# Patient Record
Sex: MALE | Race: WHITE | Employment: FULL TIME | URBAN - METROPOLITAN AREA
[De-identification: names, ages, dates, MRNs, and addresses within clinical notes are randomized per-mention and may not be internally consistent; named-entity substitution may affect disease eponyms.]

---

## 2017-05-01 ENCOUNTER — OFFICE VISIT (OUTPATIENT)
Dept: FAMILY MEDICINE CLINIC | Facility: CLINIC | Age: 42
End: 2017-05-01

## 2017-05-01 VITALS
WEIGHT: 174 LBS | BODY MASS INDEX: 30.83 KG/M2 | DIASTOLIC BLOOD PRESSURE: 66 MMHG | HEIGHT: 63 IN | SYSTOLIC BLOOD PRESSURE: 120 MMHG | RESPIRATION RATE: 16 BRPM | HEART RATE: 66 BPM

## 2017-05-01 DIAGNOSIS — M54.50 ACUTE BILATERAL LOW BACK PAIN WITHOUT SCIATICA: Primary | ICD-10-CM

## 2017-05-01 PROCEDURE — 99214 OFFICE O/P EST MOD 30 MIN: CPT | Performed by: FAMILY MEDICINE

## 2017-05-01 RX ORDER — CYCLOBENZAPRINE HCL 10 MG
10 TABLET ORAL 3 TIMES DAILY
Qty: 30 TABLET | Refills: 0 | Status: SHIPPED | OUTPATIENT
Start: 2017-05-01 | End: 2017-05-21

## 2017-05-01 RX ORDER — IBUPROFEN 600 MG/1
600 TABLET ORAL EVERY 6 HOURS PRN
Qty: 30 TABLET | Refills: 2 | Status: SHIPPED | OUTPATIENT
Start: 2017-05-01

## 2017-05-01 RX ORDER — HYDROCODONE BITARTRATE AND ACETAMINOPHEN 5; 325 MG/1; MG/1
1-2 TABLET ORAL EVERY 8 HOURS PRN
Qty: 20 TABLET | Refills: 0 | Status: SHIPPED | OUTPATIENT
Start: 2017-05-01

## 2017-05-01 NOTE — PROGRESS NOTES
Quinten Richards is a 40year old male here for Patient presents with:  Spasms: Back spasms on lower back started yesterday       HPI:     Back pain  -started yesterday   -in left lower back while reaching for a bag  -radiation to none  -no numbness or ting after use Disp: 2 Inhaler Rfl: 0   divalproex Sodium ER (DEPAKOTE ER) 500 MG Oral Tablet 24 Hr Take 2 tablets (1,000 mg total) by mouth daily.  Disp: 30 tablet Rfl: 0   PROAIR  (90 BASE) MCG/ACT Inhalation Aero Soln INHALE 2 PUFFS INTO THE LUNGS EVER Perri Munoz MD

## 2017-05-01 NOTE — PATIENT INSTRUCTIONS
-- stretching, heat or ice packs, limit anything that causes pain  -- start ibuprofen 600mg 3x/day with food for next 2-3 days, then as needed  -- cyclobenzaprine as needed for more severe pain  -- hydrocodone only for most severe  -- pain should slowly im

## 2022-04-07 PROBLEM — S22.32XA CLOSED FRACTURE OF ONE RIB OF LEFT SIDE, INITIAL ENCOUNTER: Status: ACTIVE | Noted: 2022-04-07

## 2022-04-07 PROBLEM — S27.0XXA TRAUMATIC PNEUMOTHORAX, INITIAL ENCOUNTER: Status: ACTIVE | Noted: 2022-04-07

## 2024-07-17 ENCOUNTER — OFFICE VISIT (OUTPATIENT)
Facility: CLINIC | Age: 49
End: 2024-07-17
Payer: OTHER GOVERNMENT

## 2024-07-17 VITALS
DIASTOLIC BLOOD PRESSURE: 74 MMHG | BODY MASS INDEX: 29 KG/M2 | HEART RATE: 64 BPM | SYSTOLIC BLOOD PRESSURE: 122 MMHG | WEIGHT: 182 LBS

## 2024-07-17 DIAGNOSIS — R29.898 WEAKNESS OF BOTH LOWER EXTREMITIES: Primary | ICD-10-CM

## 2024-07-17 DIAGNOSIS — M54.9 CHRONIC BILATERAL BACK PAIN, UNSPECIFIED BACK LOCATION: ICD-10-CM

## 2024-07-17 DIAGNOSIS — G89.29 CHRONIC BILATERAL BACK PAIN, UNSPECIFIED BACK LOCATION: ICD-10-CM

## 2024-07-17 RX ORDER — ATORVASTATIN CALCIUM 40 MG/1
40 TABLET, FILM COATED ORAL NIGHTLY
COMMUNITY
Start: 2023-06-03

## 2024-07-17 RX ORDER — PRASUGREL 10 MG/1
1 TABLET, FILM COATED ORAL AS DIRECTED
COMMUNITY
Start: 2023-06-03

## 2024-07-17 RX ORDER — ASPIRIN 81 MG/1
81 TABLET, COATED ORAL DAILY
COMMUNITY

## 2024-07-17 RX ORDER — DIVALPROEX SODIUM 250 MG/1
1000 TABLET, DELAYED RELEASE ORAL 3 TIMES DAILY
COMMUNITY

## 2024-07-17 RX ORDER — OMEPRAZOLE 20 MG/1
1 CAPSULE, DELAYED RELEASE ORAL AS DIRECTED
COMMUNITY
Start: 2022-08-30

## 2024-07-17 RX ORDER — CYCLOBENZAPRINE HCL 10 MG
10 TABLET ORAL 2 TIMES DAILY PRN
COMMUNITY
Start: 2024-01-30

## 2024-07-17 RX ORDER — PRAZOSIN HYDROCHLORIDE 1 MG/1
1 CAPSULE ORAL
COMMUNITY
Start: 2024-06-11 | End: 2024-07-17 | Stop reason: ALTCHOICE

## 2024-07-17 NOTE — PROGRESS NOTES
Neurology History & Physical     ASSESSMENT & PLAN:      ICD-10-CM    1. Weakness of both lower extremities  R29.898       2. Chronic bilateral back pain, unspecified back location  M54.9     G89.29           Past episodes of BLE weakness likely due to back pain.  Neuro exam is currently normal.    I advised that these events are not recurrent, and I don't suspect myelopathy, radiculopathy, or neuropathy.  I advised I would not recommend back surgery with the sole goal of pain improvement, and I don't believe there are any signs of neurological compromise.  Would not recommend further testing at this time.    We discussed symptoms that would warrant urgent/emergent evaluation. Patient verbalized understanding and agreement.    Return for concerns as needed.       ~~~~~~~~~~~~~~~~~~~~~~~~~~~    CHIEF COMPLAINT / REASON FOR VISIT:    Chief Complaint   Patient presents with    Neuropathy     Numbness in leg episodes x4. Started in the late 90s    Low Back Pain     Has been occurring late 90s radiates down into legs at time.      HISTORY OBTAINED FROM:  Patient and others as above  Chart review    HISTORY:  Collin Cash is a 49 year old male with 4 episodes of \"temporary paralysis\", first was in 1996, most recent was 10 years ago.  They may be triggered by exercise or lifting.  He has severe back pain and cannot stand up due to pain, and he cannot feel his legs.  He does have chronic back for 30+ years, he does not have leg numbness or weakness currently.  Does have pains shooting down legs chronically, this would happen randomly (not daily, maybe weekly on average), and this would be associated with tingling in the soles of his feet. Referred to me by PM&R at [facility], who did an epidural L5-S1 injection 4 days ago, which seems to be helping.  He does note lumbar pain when straining for a bowel movement.  No bowel or bladder issues otherwise.  No current issues with balance or walking.  He also sees acupuncture, PT,  chiropractor at [facility] (not helpful, thus he did injection).    DATA REVIEWED:  As documented in the history    He had MRI scans of the spine about 2 months ago.      PHYSICAL EXAMINATION:  /74   Pulse 64   Wt 182 lb (82.6 kg)   BMI 29.38 kg/m²     Gen: in NAD  MSE: AAOx3, nl language, nl attn/conc, nl fund of knowledge  CN: PERRL, VFF; EOMI, no nystagmus; nl facial mvmt bilaterally; nl hearing bilaterally; nl palate elevation bilaterally; nl voice; nl shoulder shrug b/I; nl tongue movement  Motor: 5/5 x4; no drift; normal tone; no abnormal movements  Sensory: nl vibration and temp x4  Coord: nl FTN b/I  Reflex: 2+ BUE and BLE  Gait: normal    No Known Allergies    Current medications:   ASPIRIN LOW DOSE 81 MG Oral Tab EC Take 1 tablet (81 mg total) by mouth daily.      prasugrel 10 MG Oral Tab Take 1 tablet (10 mg total) by mouth As Directed.      atorvastatin 40 MG Oral Tab Take 1 tablet (40 mg total) by mouth nightly.      cyclobenzaprine 10 MG Oral Tab Take 1 tablet (10 mg total) by mouth 2 (two) times daily as needed for Muscle spasms.      omeprazole 20 MG Oral Capsule Delayed Release Take 1 capsule (20 mg total) by mouth As Directed.      divalproex  MG Oral Tab EC DR tab Take 4 tablets (1,000 mg total) by mouth 3 (three) times daily.         Past Medical History:    Extrinsic asthma, unspecified    Myocardial infarction (HCC)    2023    Rib fractures    2 ribs       Past Surgical History:   Procedure Laterality Date    Arthroscopy of joint unlisted      l ankle, r hand    Other surgical history N/A 11/3/2014    Procedure: LAPAROSCOPIC HERNIORRHAPHY;  Surgeon: Farhat Courtney MD;  Location:  MAIN OR       Social History     Socioeconomic History    Marital status:    Tobacco Use    Smoking status: Former     Current packs/day: 0.00     Types: Cigarettes     Start date: 1991     Quit date: 1991     Years since quittin.0    Smokeless tobacco: Never   Vaping Use    Vaping  status: Never Used   Substance and Sexual Activity    Alcohol use: Yes     Alcohol/week: 0.0 - 1.0 standard drinks of alcohol     Comment: 2-3 drinks monthly    Drug use: No   Other Topics Concern    Caffeine Concern Yes     Comment: 2 cups coffee daily    Exercise Yes     Comment: 3-4 times weekly       Family History   Problem Relation Age of Onset    Hypertension Father     Lipids Father     Other (thyroid[other]) Mother     Psychiatric Maternal Grandmother         alcoholic    Psychiatric Sister         depression    Hypertension Sister          Alon Degroot MD, FAES, FAAN  Board-Certified in Neurology, Epilepsy, and Clinical Neurophysiology  [facility]

## 2024-07-17 NOTE — PATIENT INSTRUCTIONS
After your visit at the [facility] office  today,  please direct any follow up questions or medication needs to the staff in our  [place] office so that your concerns may be promptly addressed.  We are available through [employer] or at the numbers below:    The phone number is:  [phone] option #1    The fax number is:  [phone]    Your pharmacy should also send any requests electronically to the [place] office.    Refill policies:    Allow 2-3 business days for refills; controlled substances may take longer.  Contact your pharmacy at least 5 days prior to running out of medication and have them send an electronic request or submit request through the “request refill” option in your [employer] account.  Refills are not addressed on weekends; covering physicians do not authorize routine medications on weekends.  No narcotics or controlled substances are refilled after noon on Fridays or by on call physicians.  By law, narcotics must be electronically prescribed.  A 30 day supply with no refills is the maximum allowed.  If your prescription is due for a refill, you may be due for a follow up appointment.  To best provide you care, patients receiving routine medications need to be seen at least once a year.  Patients receiving narcotic/controlled substance medications need to be seen at least once every 3 months.  In the event that your preferred pharmacy does not have the requested medication in stock (e.g. Backordered), it is your responsibility to find another pharmacy that has the requested medication available.  We will gladly send a new prescription to that pharmacy at your request.    Scheduling Tests:    If your physician has ordered radiology tests such as MRI or CT scans, please contact Central Scheduling at [phone] right away to schedule the test.  Once scheduled, the [facility] Centralized Referral Team will work with your insurance carrier to obtain pre-certification or prior authorization.   Depending on your insurance carrier, approval may take 3-10 days.  It is highly recommended patients assure they have received an authorization before having a test performed.  If test is done without insurance authorization, patient may be responsible for the entire amount billed.      Precertification and Prior Authorizations:  If your physician has recommended that you have a procedure or additional testing performed the [facility] Centralized Referral Team will contact your insurance carrier to obtain pre-certification or prior authorization.    You are strongly encouraged to contact your insurance carrier to verify that your procedure/test has been approved and is a COVERED benefit.  Although the [facility] Centralized Referral Team does its due diligence, the insurance carrier gives the disclaimer that \"Although the procedure is authorized, this does not guarantee payment.\"    Ultimately the patient is responsible for payment.   Thank you for your understanding in this matter.  Paperwork Completion:  If you require FMLA or disability paperwork for your recovery, please make sure to either drop it off or have it faxed to our office at [phone]. Be sure the form has your name and date of birth on it.  The form will be faxed to our Forms Department and they will complete it for you.  There is a 25$ fee for all forms that need to be filled out.  Please be aware there is a 10-14 day turnaround time.  You will need to sign a release of information (SONI) form if your paperwork does not come with one.  You may call the Forms Department with any questions at [phone].  Their fax number is [phone].

## (undated) NOTE — Clinical Note
Date: 5/1/2017    Patient Name: Boni Taveras          To Whom it may concern: This letter has been written at the patient's request. The above patient was seen at the [facility] for treatment of a medical condition.     This patient darshanau

## (undated) NOTE — MR AVS SNAPSHOT
[address] [facility]  [phone]               Thank you for choosing us for your health care visit with Kumar Goodwin MD.  We are glad to serve you and happy to provide you with this leonardo Take 1 tablet (600 mg total) by mouth every 6 (six) hours as needed for Pain. Commonly known as:  MOTRIN           PROAIR  (90 Base) MCG/ACT Aers   Generic drug:  Albuterol Sulfate HFA   INHALE 2 PUFFS INTO THE LUNGS EVERY 6 HOURS AS NEEDED. active are less likely to develop some chronic diseases than adults who are inactive.      HOW TO GET STARTED: HOW TO STAY MOTIVATED:   Start activities slowly and build up over time Do what you like   Get your heart pumping – brisk walking, biking, swimmin